# Patient Record
Sex: FEMALE | Race: AMERICAN INDIAN OR ALASKA NATIVE
[De-identification: names, ages, dates, MRNs, and addresses within clinical notes are randomized per-mention and may not be internally consistent; named-entity substitution may affect disease eponyms.]

---

## 2018-04-17 ENCOUNTER — HOSPITAL ENCOUNTER (EMERGENCY)
Dept: HOSPITAL 5 - ED | Age: 28
Discharge: HOME | End: 2018-04-17
Payer: COMMERCIAL

## 2018-04-17 VITALS — DIASTOLIC BLOOD PRESSURE: 76 MMHG | SYSTOLIC BLOOD PRESSURE: 131 MMHG

## 2018-04-17 DIAGNOSIS — K80.80: Primary | ICD-10-CM

## 2018-04-17 LAB
ALBUMIN SERPL-MCNC: 3.8 G/DL (ref 3.9–5)
ALT SERPL-CCNC: 31 UNITS/L (ref 7–56)
BACTERIA #/AREA URNS HPF: (no result) /HPF
BASOPHILS # (AUTO): 0.1 K/MM3 (ref 0–0.1)
BASOPHILS NFR BLD AUTO: 0.7 % (ref 0–1.8)
BILIRUB UR QL STRIP: (no result)
BLOOD UR QL VISUAL: (no result)
BUN SERPL-MCNC: 10 MG/DL (ref 7–17)
BUN/CREAT SERPL: 17 %
CALCIUM SERPL-MCNC: 8.7 MG/DL (ref 8.4–10.2)
EOSINOPHIL # BLD AUTO: 0.1 K/MM3 (ref 0–0.4)
EOSINOPHIL NFR BLD AUTO: 0.9 % (ref 0–4.3)
HCT VFR BLD CALC: 38.4 % (ref 30.3–42.9)
HEMOLYSIS INDEX: 1
HGB BLD-MCNC: 12.7 GM/DL (ref 10.1–14.3)
LIPASE SERPL-CCNC: 38 UNITS/L (ref 13–60)
LYMPHOCYTES # BLD AUTO: 2.2 K/MM3 (ref 1.2–5.4)
LYMPHOCYTES NFR BLD AUTO: 21 % (ref 13.4–35)
MCH RBC QN AUTO: 29 PG (ref 28–32)
MCHC RBC AUTO-ENTMCNC: 33 % (ref 30–34)
MCV RBC AUTO: 86 FL (ref 79–97)
MONOCYTES # (AUTO): 0.5 K/MM3 (ref 0–0.8)
MONOCYTES % (AUTO): 4.5 % (ref 0–7.3)
MUCOUS THREADS #/AREA URNS HPF: (no result) /HPF
PH UR STRIP: 6 [PH] (ref 5–7)
PLATELET # BLD: 164 K/MM3 (ref 140–440)
RBC # BLD AUTO: 4.44 M/MM3 (ref 3.65–5.03)
RBC #/AREA URNS HPF: 1 /HPF (ref 0–6)
UROBILINOGEN UR-MCNC: 4 MG/DL (ref ?–2)
WBC #/AREA URNS HPF: 1 /HPF (ref 0–6)

## 2018-04-17 PROCEDURE — 80053 COMPREHEN METABOLIC PANEL: CPT

## 2018-04-17 PROCEDURE — 85025 COMPLETE CBC W/AUTO DIFF WBC: CPT

## 2018-04-17 PROCEDURE — 96372 THER/PROPH/DIAG INJ SC/IM: CPT

## 2018-04-17 PROCEDURE — 84703 CHORIONIC GONADOTROPIN ASSAY: CPT

## 2018-04-17 PROCEDURE — 36415 COLL VENOUS BLD VENIPUNCTURE: CPT

## 2018-04-17 PROCEDURE — 83690 ASSAY OF LIPASE: CPT

## 2018-04-17 PROCEDURE — 76705 ECHO EXAM OF ABDOMEN: CPT

## 2018-04-17 PROCEDURE — 99284 EMERGENCY DEPT VISIT MOD MDM: CPT

## 2018-04-17 PROCEDURE — 81001 URINALYSIS AUTO W/SCOPE: CPT

## 2018-04-17 NOTE — ULTRASOUND REPORT
FINAL REPORT



PROCEDURE:  US ABDOMEN LIMITED



TECHNIQUE:  Real-time sonography was performed of the right upper

quadrant with image documentation. CPT 05580



HISTORY:  RUQ pain 



COMPARISON:  No prior studies are available for comparison.



FINDINGS:  

Exam of the right upper quadrant shows normal appearance of the

liver. The echogenicity appears normal. No masses are seen. There

is no ascites. The intrahepatic ducts are not distended. Common

bile duct is normal caliber measuring 2.4 millimeters. The

gallbladder is partially contracted and visualization is limited.

Small echogenic foci appears to be visualized dependently in the

gallbladder measuring approximately 3.9 millimeter suggesting a

small gallstone or possibly a small polyp. Gallbladder wall does

not appear to be thickened. Right kidney showed no abnormalities

measuring 10.7 centimeters greatest length. Pancreas is

unremarkable..



IMPRESSION:  

Visualization of the gallbladder is significantly limited. The

gallbladder is contracted. As indicated above a small echogenic

foci appears to be present in the gallbladder which may represent

a small noncalcified gallstone or possibly a polyp. Consider

follow-up exam after the patient has been fasting.



No other abnormality is seen.

## 2018-04-17 NOTE — EMERGENCY DEPARTMENT REPORT
ED Abdominal Pain HPI





- General


Chief Complaint: Abdominal Pain


Stated Complaint: ROB/BACK PAIN


Time Seen by Provider: 04/17/18 12:11


Source: patient


Mode of arrival: Stretcher


Limitations: No Limitations





- History of Present Illness


Initial Comments: 





This is a 27-year-old female nontoxic, well nourished in appearance, no acute 

signs of distress presents to the ED with c/o of right upper quadrant pain x1 

day. Patient stated yesterday morning she started to have discomfort radiating 

to right side upper back.  Patient describes pain as aching and cramping. 

Patient denies any nausea, vomiting, chest pain, shortness of breathe, fever, 

chills, stiff neck, headache, constipation of diarrhea. Patient denies any 

recent ravels or long car rides. Patient stated after a lengthy stay in the ED 

and medical treatment, abdominal pain has subsided.  Patient denies any 

allergies or PMH. Patient denies any allergies or PMH.  


MD Complaint: abdominal pain


-: days(s) (1)


Location: RUQ


Radiation: R flank


Migration to: no migration


Severity: mild


Severity scale (0 -10): 8


Quality: cramping, aching


Consistency: now resolved


Improves With: nothing


Worsens With: nothing


Associated Symptoms: denies other symptoms.  denies: nausea, vomiting, diarrhea

, fever, chills, constipation, dysuria, hematemesis, hematochezia, melena, 

hematuria, anorexia, syncope





- Related Data


 Previous Rx's











 Medication  Instructions  Recorded  Last Taken  Type


 


Ibuprofen [Motrin] 600 mg PO Q8H PRN #30 tablet 04/17/18 Unknown Rx











 Allergies











Allergy/AdvReac Type Severity Reaction Status Date / Time


 


No Known Allergies Allergy   Unverified 04/17/18 05:06














ED Review of Systems


ROS: 


Stated complaint: ROB/BACK PAIN


Other details as noted in HPI





Constitutional: denies: chills, fever


Eyes: denies: eye pain, eye discharge, vision change


ENT: denies: ear pain, throat pain


Respiratory: denies: cough, shortness of breath, wheezing


Cardiovascular: denies: chest pain, palpitations


Endocrine: no symptoms reported


Gastrointestinal: abdominal pain.  denies: nausea, diarrhea


Genitourinary: denies: urgency, dysuria, discharge


Musculoskeletal: denies: back pain, joint swelling, arthralgia


Skin: denies: rash, lesions


Neurological: denies: headache, weakness, paresthesias


Psychiatric: denies: anxiety, depression


Hematological/Lymphatic: denies: easy bleeding, easy bruising





ED Past Medical Hx





- Past Medical History


Previous Medical History?: No





- Surgical History


Additional Surgical History: Ovarian cysy





- Social History


Smoking Status: Never Smoker


Substance Use Type: None





- Medications


Home Medications: 


 Home Medications











 Medication  Instructions  Recorded  Confirmed  Last Taken  Type


 


Ibuprofen [Motrin] 600 mg PO Q8H PRN #30 tablet 04/17/18  Unknown Rx














ED Physical Exam





- General


Limitations: No Limitations


General appearance: alert, in no apparent distress





- Head


Head exam: Present: atraumatic, normocephalic





- Eye


Eye exam: Present: normal appearance


Pupils: Present: normal accommodation





- ENT


ENT exam: Present: normal exam, mucous membranes moist





- Neck


Neck exam: Present: normal inspection, full ROM.  Absent: tenderness, 

meningismus





- Respiratory


Respiratory exam: Present: normal lung sounds bilaterally.  Absent: respiratory 

distress, wheezes, rales, rhonchi, stridor





- Cardiovascular


Cardiovascular Exam: Present: regular rate, normal rhythm, normal heart sounds.

  Absent: bradycardia, tachycardia, irregular rhythm, systolic murmur, 

diastolic murmur, rubs, gallop





- GI/Abdominal


GI/Abdominal exam: Present: soft, tenderness (RUQ), normal bowel sounds.  Absent

: distended, guarding, rebound, rigid, diminished bowel sounds, hyperactive 

bowel sounds, hypoactive bowel sounds





- Expanded GI/Abdominal Exam


  ** Expanded


GI/Abdominal exam: Absent: psoas sign, obturator sign, heel tap sign, Powers's 

sign, Rovsing's sign, tenderness at [place]bur[place]'s Point, ascites





- Rectal


Rectal exam: Present: deferred





- Extremities Exam


Extremities exam: Present: normal inspection, full ROM, normal capillary 

refill.  Absent: tenderness





- Back Exam


Back exam: Present: normal inspection, full ROM.  Absent: tenderness, CVA 

tenderness (R), CVA tenderness (L), muscle spasm, paraspinal tenderness, 

vertebral tenderness, rash noted





- Neurological Exam


Neurological exam: Present: alert, oriented X3, normal gait





- Psychiatric


Psychiatric exam: Present: normal affect, normal mood





- Skin


Skin exam: Present: warm, dry, intact, normal color.  Absent: rash





ED Course





 Vital Signs











  04/17/18 04/17/18 04/17/18





  04:56 09:24 12:47


 


Temperature 98.6 F 98.8 F 98.8 F


 


Pulse Rate 80 64 80


 


Respiratory  16 16





Rate   


 


Blood Pressure 128/84 121/88 


 


Blood Pressure   123/81





[Left]   


 


O2 Sat by Pulse 98 100 100





Oximetry   














- Reevaluation(s)


Reevaluation #1: 





04/17/18 16:53


Patient is speaking in full sentences with no signs of distress noted.





- Consultations


Consultation #1: 





04/17/18 16:54


Patient has been consulted with Dr. Duron about patient history, physical exam

, and labs and examined and screened patient and agrees to ED plan of care and 

discharge plan of care. 





ED Medical Decision Making





- Lab Data


Result diagrams: 


 04/17/18 05:11





 04/17/18 05:11





- Medical Decision Making





This is a 27-year-old female who presents with gallstones.  Patient stable was 

examined by me and Dr. Duron.  Ultrasound has been obtained and dictated by 

Dr. Villalpando.  REPORT HAS BEEN FAXED OVER TO ME.  AND INDICATES A SMALL 

GALLSTONE OR POSSIBLY A SMALL POLYP.  UPON EXAMINATION THERE IS NO TENDERNESS 

AS SHE STATED THAT THIS HAS RESOLVED AND SUBSIDED.  THERE IS NO ABDOMINAL 

DISTENTION.  Labs obtained. Patient is discharged with Motrin and she was 

instructed Follow-up with a primary care doctor in 3-5 days or if symptoms 

worsen and continue return to emergency room as soon as possible.  At time of 

discharge, the patient does not seem toxic or ill in appearance.  No acute 

signs of distress noted.  Patient agrees to discharge treatment plan of care.  

No further questions noted by the patient.


Critical care attestation.: 


If time is entered above; I have spent that time in minutes in the direct care 

of this critically ill patient, excluding procedure time.








ED Disposition


Clinical Impression: 


Gallstone


Qualifiers:


 Cholecystitis presence: without cholecystitis Biliary obstruction: with 

biliary obstruction Qualified Code(s): K80.21 - Calculus of gallbladder without 

cholecystitis with obstruction





Disposition: DC-01 TO HOME OR SELFCARE


Is pt being admited?: No


Does the pt Need Aspirin: No


Condition: Stable


Instructions:  Biliary Colic (ED), Ibuprofen (By mouth)


Additional Instructions: 


Follow-up with a primary care doctor in 3-5 days or if symptoms worsen and 

continue return to emergency room as soon as possible. 


Prescriptions: 


Ibuprofen [Motrin] 600 mg PO Q8H PRN #30 tablet


 PRN Reason: Pain


Referrals: 


PRIMARY CARE,MD [Primary Care Provider] - 3-5 Days


SANDRA KELLER MD [Staff Physician] - 3-5 Days


[facility] [Outside] - 3-5 Days


[facility] [Outside] - 3-5 Days


Forms:  Work/School Release Form(ED)

## 2018-04-17 NOTE — EMERGENCY DEPARTMENT REPORT
Blank Doc





- Documentation


Documentation: 





Patient is a 27-year-old female who is presenting with right upper quadrant 

pain.  Patient states yesterday morning she started having some right upper 

quadrant discomfort radiating to the upper back.  Patient states there is been 

no nausea or fever but she has had multiple episodes of diarrhea.  Brief 

physical exam patient is pain-free at this time secondary to a Toradol shot 

laboratory studies within normal limits.  Patient still needs urinalysis and 

ultrasound and upper right quadrant to rule out gallstones.  Patient will be 

reassessed by the [facility]